# Patient Record
Sex: MALE | Race: BLACK OR AFRICAN AMERICAN | NOT HISPANIC OR LATINO | URBAN - METROPOLITAN AREA
[De-identification: names, ages, dates, MRNs, and addresses within clinical notes are randomized per-mention and may not be internally consistent; named-entity substitution may affect disease eponyms.]

---

## 2023-09-27 ENCOUNTER — OFFICE (OUTPATIENT)
Dept: URBAN - METROPOLITAN AREA CLINIC 19 | Facility: CLINIC | Age: 78
End: 2023-09-27

## 2023-09-27 VITALS
DIASTOLIC BLOOD PRESSURE: 86 MMHG | SYSTOLIC BLOOD PRESSURE: 140 MMHG | HEIGHT: 66 IN | OXYGEN SATURATION: 99 % | WEIGHT: 173 LBS | HEART RATE: 72 BPM

## 2023-09-27 DIAGNOSIS — Z86.010 PERSONAL HISTORY OF COLONIC POLYPS: ICD-10-CM

## 2023-09-27 DIAGNOSIS — K76.9 LIVER DISEASE, UNSPECIFIED: ICD-10-CM

## 2023-09-27 PROCEDURE — 99204 OFFICE O/P NEW MOD 45 MIN: CPT

## 2023-09-27 RX ORDER — SODIUM PICOSULFATE, MAGNESIUM OXIDE, AND ANHYDROUS CITRIC ACID 10; 3.5; 12 MG/160ML; G/160ML; G/160ML
LIQUID ORAL
Qty: 350 | Refills: 0 | Status: ACTIVE
Start: 2023-09-27

## 2023-09-27 NOTE — SERVICENOTES
The patient's assessment was reviewed with Dr. Nuñez and a collaborative plan of care was established.

## 2023-09-27 NOTE — SERVICEHPINOTES
78-year-old  black male here with his significant other apparently referred by Rogelio Rollins MD after a recent CT scan found abnormal liver lesion.  The patient was told to follow up with GI regarding this.  Unfortunately, I do not have the CT scan to review.  He has no GI symptoms at this time, including reflux, heartburn, nausea, vomiting, dysphagia, abdominal pain, change in bowel habits or overt GI bleeding.  He does not drink alcohol.  He has a history of prostate cancer in 2010 where he had surgery and underwent radiation therapy.  He follows with Dr. Rollins regularly for this and was the reason for his CT scan.  He had a colonoscopy apparently around the time of his prostate cancer where he had a few polyps removed.  This was done at [facility] and he denies being told when he needs to come back for recall colonoscopy.  Family history is negative for colon neoplasm.